# Patient Record
Sex: MALE | Race: OTHER
[De-identification: names, ages, dates, MRNs, and addresses within clinical notes are randomized per-mention and may not be internally consistent; named-entity substitution may affect disease eponyms.]

---
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
## 2019-04-09 ENCOUNTER — HOSPITAL ENCOUNTER (EMERGENCY)
Dept: HOSPITAL 61 - ER | Age: 19
Discharge: HOME | End: 2019-04-09
Payer: SELF-PAY

## 2019-04-09 VITALS — WEIGHT: 180 LBS | BODY MASS INDEX: 28.93 KG/M2 | HEIGHT: 66 IN

## 2019-04-09 VITALS — SYSTOLIC BLOOD PRESSURE: 134 MMHG | DIASTOLIC BLOOD PRESSURE: 60 MMHG

## 2019-04-09 DIAGNOSIS — R10.13: ICD-10-CM

## 2019-04-09 DIAGNOSIS — R10.11: Primary | ICD-10-CM

## 2019-04-09 DIAGNOSIS — K76.0: ICD-10-CM

## 2019-04-09 LAB
ALBUMIN SERPL-MCNC: 4.3 G/DL (ref 3.4–5)
ALBUMIN/GLOB SERPL: 1.1 {RATIO} (ref 1–1.7)
ALP SERPL-CCNC: 122 U/L (ref 46–116)
ALT SERPL-CCNC: 48 U/L (ref 16–63)
ANION GAP SERPL CALC-SCNC: 11 MMOL/L (ref 6–14)
APTT PPP: YELLOW S
AST SERPL-CCNC: 27 U/L (ref 15–37)
BACTERIA #/AREA URNS HPF: 0 /HPF
BASOPHILS # BLD AUTO: 0.1 X10^3/UL (ref 0–0.2)
BASOPHILS NFR BLD: 1 % (ref 0–3)
BILIRUB SERPL-MCNC: 0.6 MG/DL (ref 0.2–1)
BILIRUB UR QL STRIP: NEGATIVE
BUN SERPL-MCNC: 9 MG/DL (ref 8–26)
BUN/CREAT SERPL: 8 (ref 6–20)
CALCIUM SERPL-MCNC: 9.1 MG/DL (ref 8.5–10.1)
CHLORIDE SERPL-SCNC: 102 MMOL/L (ref 98–107)
CO2 SERPL-SCNC: 27 MMOL/L (ref 21–32)
CREAT SERPL-MCNC: 1.1 MG/DL (ref 0.7–1.3)
EOSINOPHIL NFR BLD: 0.2 X10^3/UL (ref 0–0.7)
EOSINOPHIL NFR BLD: 2 % (ref 0–3)
ERYTHROCYTE [DISTWIDTH] IN BLOOD BY AUTOMATED COUNT: 13.3 % (ref 11.5–14.5)
FIBRINOGEN PPP-MCNC: CLEAR MG/DL
GFR SERPLBLD BASED ON 1.73 SQ M-ARVRAT: 85.3 ML/MIN
GLOBULIN SER-MCNC: 3.9 G/DL (ref 2.2–3.8)
GLUCOSE SERPL-MCNC: 101 MG/DL (ref 70–99)
HCT VFR BLD CALC: 44.5 % (ref 39–53)
HGB BLD-MCNC: 15 G/DL (ref 13–17.5)
LIPASE: 145 U/L (ref 73–393)
LYMPHOCYTES # BLD: 2.7 X10^3/UL (ref 1–4.8)
LYMPHOCYTES NFR BLD AUTO: 24 % (ref 24–48)
MCH RBC QN AUTO: 28 PG (ref 25–35)
MCHC RBC AUTO-ENTMCNC: 34 G/DL (ref 31–37)
MCV RBC AUTO: 82 FL (ref 79–100)
MONO #: 1 X10^3/UL (ref 0–1.1)
MONOCYTES NFR BLD: 9 % (ref 0–9)
NEUT #: 7.5 X10^3UL (ref 1.8–7.7)
NEUTROPHILS NFR BLD AUTO: 65 % (ref 31–73)
NITRITE UR QL STRIP: NEGATIVE
PH UR STRIP: 6.5 [PH]
PLATELET # BLD AUTO: 259 X10^3/UL (ref 140–400)
POTASSIUM SERPL-SCNC: 4 MMOL/L (ref 3.5–5.1)
PROT SERPL-MCNC: 8.2 G/DL (ref 6.4–8.2)
PROT UR STRIP-MCNC: NEGATIVE MG/DL
RBC # BLD AUTO: 5.4 X10^6/UL (ref 4.3–5.7)
RBC #/AREA URNS HPF: (no result) /HPF (ref 0–2)
SODIUM SERPL-SCNC: 140 MMOL/L (ref 136–145)
UROBILINOGEN UR-MCNC: 1 MG/DL
WBC # BLD AUTO: 11.5 X10^3/UL (ref 4–11)
WBC #/AREA URNS HPF: (no result) /HPF (ref 0–4)

## 2019-04-09 PROCEDURE — 99284 EMERGENCY DEPT VISIT MOD MDM: CPT

## 2019-04-09 PROCEDURE — 83690 ASSAY OF LIPASE: CPT

## 2019-04-09 PROCEDURE — 36415 COLL VENOUS BLD VENIPUNCTURE: CPT

## 2019-04-09 PROCEDURE — 85025 COMPLETE CBC W/AUTO DIFF WBC: CPT

## 2019-04-09 PROCEDURE — 96375 TX/PRO/DX INJ NEW DRUG ADDON: CPT

## 2019-04-09 PROCEDURE — 96374 THER/PROPH/DIAG INJ IV PUSH: CPT

## 2019-04-09 PROCEDURE — 76705 ECHO EXAM OF ABDOMEN: CPT

## 2019-04-09 PROCEDURE — 80053 COMPREHEN METABOLIC PANEL: CPT

## 2019-04-09 PROCEDURE — 81001 URINALYSIS AUTO W/SCOPE: CPT

## 2019-04-09 NOTE — PHYS DOC
Past Medical History


Past Medical History:  No Pertinent History


 (ILANA PICKETT DO)


Past Surgical History:  No Surgical History


 (ILANA PICKETT DO)


Alcohol Use:  None


Drug Use:  None


 (ILANA PICKETT DO)





Adult General


Chief Complaint


Chief Complaint:  ABDOMINAL PAIN





HPI


HPI





18-year-old otherwise healthy male presents with 1-2 day history of right upper 

quadrant abdominal pain. He stated initially it was waxing and waning last 

night the pain kept him up all night. He states eating makes it worse. He 

denies any melena or hematemesis. He states he's never had this kind of pain 

before. He denies any fever chills or sweats. He has not had a cough. He's had 

no dysuria or gross hematuria. He states the pain does radiate to his back.[]


 (ILANA PICKETT DO)





Review of Systems


Review of Systems





Constitutional: Denies fever or chills []


Eyes: Denies change in visual acuity, redness, or eye pain []


HENT: Denies nasal congestion or sore throat []


Respiratory: Denies cough or shortness of breath []


Cardiovascular: No additional information not addressed in HPI []


GI: Per history of present illness[]


: Denies dysuria or hematuria []


Musculoskeletal: Denies back pain or joint pain []


Integument: Denies rash or skin lesions []


Neurologic: Denies headache, focal weakness or sensory changes []


Endocrine: Denies polyuria or polydipsia []





All other systems were reviewed and found to be within normal limits, except as 

documented in this note.


 (ILANA PICKETT DO)





Current Medications


Current Medications





Current Medications








 Medications


  (Trade)  Dose


 Ordered  Sig/Mandy  Start Time


 Stop Time Status Last Admin


Dose Admin


 


 Fentanyl Citrate


  (Fentanyl 2ml


 Vial)  50 mcg  1X  ONCE  19 05:30


 19 05:31 DC 19 05:56


50 MCG


 


 Ondansetron HCl


  (Zofran)  4 mg  1X  ONCE  19 05:30


 19 05:31 DC 19 05:55


4 MG


 


 Sodium Chloride  1,000 ml @ 


 1,000 mls/hr  1X  ONCE  19 05:30


 19 06:29 DC 19 05:54


1,000 MLS/HR





 (SCOTTY HATCH MD)





Allergies


Allergies





Allergies








Coded Allergies Type Severity Reaction Last Updated Verified


 


  No Known Drug Allergies    17 No





 (SCOTTY HATCH MD)





Physical Exam


Physical Exam





Constitutional: Well developed, well nourished, mild distress, non-toxic 

appearance. []


HENT: Normocephalic, atraumatic, bilateral external ears normal, oropharynx 

moist, no oral exudates, nose normal. []


Eyes: PERRLA, EOMI, conjunctiva normal, no discharge. [] 


Neck: Normal range of motion, no tenderness, supple, no stridor. [] 


Cardiovascular:Heart rate regular rhythm, no murmur []


Lungs & Thorax:  Bilateral breath sounds clear to auscultation []


Abdomen: Right upper quadrant[] 


Skin: Warm, dry, no erythema, no rash. [] 


Back: No tenderness, no CVA tenderness. [] 


Extremities: No tenderness, no cyanosis, no clubbing, ROM intact, no edema. [] 


Neurologic: Alert and oriented X 3, normal motor function, normal sensory 

function, no focal deficits noted. []


Psychologic: Affect normal, judgement normal, mood normal. []


 (ILANA PICKETT DO)





Current Patient Data


Vital Signs





 Vital Signs








  Date Time  Temp Pulse Resp B/P (MAP) Pulse Ox O2 Delivery O2 Flow Rate FiO2


 


19 06:26   14  98 Room Air  


 


19 06:16  76  128/65 (86)    


 


19 04:52 98.2       





 98.2       





 (SCOTTY HATCH MD)


Lab Values





 Laboratory Tests








Test


 19


05:41 19


05:49


 


Urine Color Yellow   


 


Urine Clarity Clear   


 


Urine pH 6.5   


 


Urine Specific Gravity 1.025   


 


Urine Protein


 Negative mg/dL


(NEG-TRACE) 





 


Urine Glucose (UA)


 Negative mg/dL


(NEG) 





 


Urine Ketones (Stick)


 15 mg/dL (NEG)


 





 


Urine Blood


 Negative (NEG)


 





 


Urine Nitrite


 Negative (NEG)


 





 


Urine Bilirubin


 Negative (NEG)


 





 


Urine Urobilinogen Dipstick


 1.0 mg/dL (0.2


mg/dL) 





 


Urine Leukocyte Esterase


 Negative (NEG)


 





 


Urine RBC


 Occ /HPF (0-2)


 





 


Urine WBC


 1-4 /HPF (0-4)


 





 


Urine Bacteria


 0 /HPF (0-FEW)


 





 


Urine Mucus Marked /LPF   


 


White Blood Count


 


 11.5 x10^3/uL


(4.0-11.0)  H


 


Red Blood Count


 


 5.40 x10^6/uL


(4.30-5.70)


 


Hemoglobin


 


 15.0 g/dL


(13.0-17.5)


 


Hematocrit


 


 44.5 %


(39.0-53.0)


 


Mean Corpuscular Volume


 


 82 fL ()





 


Mean Corpuscular Hemoglobin  28 pg (25-35)  


 


Mean Corpuscular Hemoglobin


Concent 


 34 g/dL


(31-37)


 


Red Cell Distribution Width


 


 13.3 %


(11.5-14.5)


 


Platelet Count


 


 259 x10^3/uL


(140-400)


 


Neutrophils (%) (Auto)  65 % (31-73)  


 


Lymphocytes (%) (Auto)  24 % (24-48)  


 


Monocytes (%) (Auto)  9 % (0-9)  


 


Eosinophils (%) (Auto)  2 % (0-3)  


 


Basophils (%) (Auto)  1 % (0-3)  


 


Neutrophils # (Auto)


 


 7.5 x10^3uL


(1.8-7.7)


 


Lymphocytes # (Auto)


 


 2.7 x10^3/uL


(1.0-4.8)


 


Monocytes # (Auto)


 


 1.0 x10^3/uL


(0.0-1.1)


 


Eosinophils # (Auto)


 


 0.2 x10^3/uL


(0.0-0.7)


 


Basophils # (Auto)


 


 0.1 x10^3/uL


(0.0-0.2)


 


Sodium Level


 


 140 mmol/L


(136-145)


 


Potassium Level


 


 4.0 mmol/L


(3.5-5.1)


 


Chloride Level


 


 102 mmol/L


()


 


Carbon Dioxide Level


 


 27 mmol/L


(21-32)


 


Anion Gap  11 (6-14)  


 


Blood Urea Nitrogen


 


 9 mg/dL (8-26)





 


Creatinine


 


 1.1 mg/dL


(0.7-1.3)


 


Estimated GFR


(Cockcroft-Gault) 


 85.3  





 


BUN/Creatinine Ratio  8 (6-20)  


 


Glucose Level


 


 101 mg/dL


(70-99)  H


 


Calcium Level


 


 9.1 mg/dL


(8.5-10.1)


 


Total Bilirubin


 


 0.6 mg/dL


(0.2-1.0)


 


Aspartate Amino Transferase


(AST) 


 27 U/L (15-37)





 


Alanine Aminotransferase (ALT)


 


 48 U/L (16-63)





 


Alkaline Phosphatase


 


 122 U/L


()  H


 


Total Protein


 


 8.2 g/dL


(6.4-8.2)


 


Albumin


 


 4.3 g/dL


(3.4-5.0)


 


Albumin/Globulin Ratio  1.1 (1.0-1.7)  


 


Lipase


 


 145 U/L


()





 Laboratory Tests


19 05:49








 Laboratory Tests


19 05:49








 (SCOTTY HATCH MD)





EKG


EKG


[]


 (ILANA PICKETT DO)





Radiology/Procedures


Radiology/Procedures


[]


 (ILANA PICKETT DO)


Radiology/Procedures


[facility]


 [address] [facility] [place] [address]


 [phone]


 


 IMAGING REPORT





 Signed





PATIENT: NAZANIN GONZALEZ ACCOUNT: [id number] MRN#: [id number]


: 2000 LOCATION: ER AGE: 18


SEX: M EXAM DT: 19 ACCESSION#: [id number]


STATUS: REG ER ORD. PHYSICIAN: ILANA PICKETT DO 


REASON: ruq pain - r/o cholecystitis


PROCEDURE: ABDOMEN LTD





CLINICAL HISTORY: RUQ PAIN 


 


COMPARISON: None available.


 


TECHNIQUE:


 Limited ultrasound examination of the right upper quadrant of the abdomen


was performed


 


FINDINGS:


Liver:  The liver measures 15.1 cm in length in the right mid clavicular 


line. Increased echogenicity of the liver relative to the right kidney 


consistent with hepatic steatosis..  There is no focal abnormality of the 


liver.  Portal and hepatic venous flow is confirmed with normal waveforms.


 


Gallbladder/Biliary: The gallbladder is normal in appearance without 


evidence for cholelithiasis.  There is no wall thickening or 


pericholecystic fluid.  There is no pain with direct transducer pressure 


over the gallbladder.The common bile duct measures 0.4 cm.


 


Pancreas is obscured by overlying bowel gas.


 


The right kidney measures 9.4 cm in bipolar length.


 


There is  no free fluid in the subhepatic space.


 


IMPRESSION: 


1.  Hepatic steatosis.


2.  No evidence for acute cholecystitis.


3.  No definite gallstones are seen.


 


Electronically signed by: Yusuf Storey MD (2019 6:41 AM) [facility]














DICTATED and SIGNED BY:     YUSUF STOREY MD


DATE:     19 0641


 (SCOTTY HATCH MD)





Course & Med Decision Making


Course & Med Decision Making


Pertinent Labs and Imaging studies reviewed. (See chart for details)





[ED course: Evaluation reveals 18-year-old male with right upper quadrant 

abdominal pain. I suspect he has biliary colic. We will have blood work drawn 

and a gallbladder ultrasound ordered. This testing will be deferred to the 

oncoming provider Dr. Hatch to follow-up on.]


 (ILANA PICKETT DO)


Course & Med Decision Making


Evaluation of patient in ER showed 18-year-old male patient with complaining of 

right upper quadrant pain without any nausea vomiting and diarrhea and other GI 

symptoms. Patient had unremarkable physical exam and labs and gallbladder 

ultrasound. Patient for definitive treatment in ER. Plan to discharge patient 

home to diagnose of dyspepsia.


 (SCOTTY HATCH MD)


Dragon Disclaimer


Dragon Disclaimer


This electronic medical record was generated, in whole or in part, using a 

voice recognition dictation system.


 (ILANA PICKETT DO)





Departure


Departure


Impression:  


 Primary Impression:  


 Abdominal pain


 Additional Impression:  


 Dyspepsia


Disposition:   HOME, SELF-CARE (at 0657)


Condition:  IMPROVED


Referrals:  


NO PCP (PCP)


Patient Instructions:  Diet for Gastroesophageal Reflux Disease, Adult, 

Gastroesophageal Reflux Disease, Adult





Additional Instructions:  


Drink plenty of liquids


Follow-up with your primary care physician in 3-5 days


Return to ER if not getting better


Scripts


Ranitidine Hcl (ZANTAC) 150 Mg Tablet


1 TAB PO BID for dyspepsia, #14 TAB 0 Refills


   Prov: SCOTTY HATCH MD         19





Problem Qualifiers








 Primary Impression:  


 Abdominal pain


 Abdominal location:  right upper quadrant  Qualified Codes:  R10.11 - Right 

upper quadrant pain








ILANA PICKETT DO 2019 05:27


SCOTTY HATCH MD 2019 06:59

## 2019-04-09 NOTE — RAD
CLINICAL HISTORY: RUQ PAIN 

 

COMPARISON: None available.

 

TECHNIQUE:

 Limited ultrasound examination of the right upper quadrant of the abdomen

was performed

 

FINDINGS:

Liver:  The liver measures 15.1 cm in length in the right mid clavicular 

line. Increased echogenicity of the liver relative to the right kidney 

consistent with hepatic steatosis..  There is no focal abnormality of the 

liver.  Portal and hepatic venous flow is confirmed with normal waveforms.

 

Gallbladder/Biliary: The gallbladder is normal in appearance without 

evidence for cholelithiasis.  There is no wall thickening or 

pericholecystic fluid.  There is no pain with direct transducer pressure 

over the gallbladder.The common bile duct measures 0.4 cm.

 

Pancreas is obscured by overlying bowel gas.

 

The right kidney measures 9.4 cm in bipolar length.

 

There is  no free fluid in the subhepatic space.

 

IMPRESSION: 

1.  Hepatic steatosis.

2.  No evidence for acute cholecystitis.

3.  No definite gallstones are seen.

 

Electronically signed by: Yusuf Davis MD (4/9/2019 6:41 AM) [facility]